# Patient Record
(demographics unavailable — no encounter records)

---

## 2024-11-26 NOTE — ASSESSMENT
[FreeTextEntry1] : Reyna is a 15 y/o female who presents with her father for behavioral and attention concerns.  She does not have school-based accommodations.  She is an honor student however this year struggling in school related to incomplete assignments.  There is a strong family history of ADHD.   Patient was screened negative for problems such as hearing, vision, anxiety, depression, and sleep problems. Will proceed with evaluation for ADHD.  If meets criteria, patient/father would like her to start ADHD medication.

## 2024-11-26 NOTE — PHYSICAL EXAM
[Well-appearing] : well-appearing [No dysmorphic facial features] : no dysmorphic facial features [Alert] : alert [Well related, good eye contact] : well related, good eye contact [Conversant] : conversant [Normal speech and language] : normal speech and language [Follows instructions well] : follows instructions well [Gross hearing intact] : gross hearing intact [Normal gait] : normal gait [de-identified] : Interactive and appropriate mood, friendly, well spoken.

## 2024-11-26 NOTE — HISTORY OF PRESENT ILLNESS
[FreeTextEntry1] : Reyna is a 15 y/o female here for an initial visit for inattention and behavioral concerns.  Patient lives with parents and sister (13 y/o) and attends 10 th grade at [employer].  They live in [place], father is a  and mother an oral pathologist.  Father endorses patient has always been an excellent student until recently and is concerned that her academic performance has declined due to ADHD-related problems.  She procrastinates and has a hard time getting her work completed and handing things in on time.  Grades started to decline last year.  Father feels the material is not overly hard, it is more organizational problems.. She does go to extra help often and is able to focus with the 1:1 closer attention.  The family moved from [place] to [place] in middle school however she did well despite the transition.   Father has a diagnosis of ADD and feels he had the same trajectory.  He takes Vyvanse   Patient endorses last year she was in high honors last year, and now near failing in math and chemistry. Highest grade in math was 75% and chemistry 65%.   She zones out in every class, her mind is always somewhere else, her mind is not hearing things, especially in a boring class.   Teachers have not noticed. For tests she can lock in on.  She sometimes gets nervous before a test but usually feels she is going to do well.  She avoids doing her homework, and this brings her grade down.  She has so many late assignments, and just can't sit down to do it under she feels she is failing or will do it in the period before.   She is very fidgety and asks to use the bathroom a lot of walk around.  She felt a lot of "pent upness." Patient denies having anxiety or depression but sometimes worries about bad things happening.  She has no difficulties with peers.    DEVELOPMENTAL HX  Child was born full term without complications and reached all age-appropriate developmental milestones without concerns. Early Intervention Services were not needed.   EDUCATIONAL HISTORY   Elementary School; [employer] in [place], no issues, great student. Moved to [place] in 6th grade (during Covid)                                                                                        Middle School: [employer]. Did well but more challenging for organization.    HOME BEHAVIORS:   -Attention: Parent report patient is very easily distracted, needs frequent redirection, and has sometimes is forgetful and sometimes difficulty with multi-step instructions.  Hard to get her to do what she doesn't wants to do or is not interested.     -Hyperactivity: Fidgets all the time.    -Impulsivity:  Is not aggressive, does not interrupts when others are speaking.   -Organization: Has difficulty staying organized, often loses things.     -Homework: Forgets to hand in assignments.       CURRENT SCHOOL -School: [employer]  -Special Ed services- No 504 plan or IEP - General education classroom -Academic performance/grades:  Failing chemistry, 75% in math (previously high honor role)   RELATIONSHIPS: Gets along well with peers, parents.   EMOTIONAL  Denies anxiety/depression  SLEEP Sleep is very good, gets 8-9 hours/night.    ACTIVITIES/INTERESTS: Boxing, tennis  MEDICAL HISTORY: Denies a history of tics Denies history of starting spells, twitching, seizure-like activity.   FAMILY HISTORY: Family history of ADHD: Father (takes Vyvanse)  Family history of anxiety or depression: Denies Denies family history of cardiac conditions or early unexplained deaths.

## 2024-11-26 NOTE — PLAN
[FreeTextEntry1] : -Psychoeducation provided regarding possible ADHD diagnosis. -[place] parent and teacher forms to be completed prior to next visit. -Follow up in 1 month

## 2024-11-26 NOTE — PHYSICAL EXAM
[Well-appearing] : well-appearing [No dysmorphic facial features] : no dysmorphic facial features [Alert] : alert [Well related, good eye contact] : well related, good eye contact [Conversant] : conversant [Normal speech and language] : normal speech and language [Follows instructions well] : follows instructions well [Gross hearing intact] : gross hearing intact [Normal gait] : normal gait [de-identified] : Interactive and appropriate mood, friendly, well spoken.

## 2024-11-26 NOTE — PLAN
[FreeTextEntry1] : -Psychoeducation provided regarding possible ADHD diagnosis. -[place] parent and teacher forms to be completed prior to next visit. -Follow up in 1 month

## 2024-11-26 NOTE — HISTORY OF PRESENT ILLNESS
[FreeTextEntry1] : Reyna is a 15 y/o female here for an initial visit for inattention and behavioral concerns.  Patient lives with parents and sister (13 y/o) and attends 10 th grade at [employer].  They live in [place], father is a  and mother an oral pathologist.  Father endorses patient has always been an excellent student until recently and is concerned that her academic performance has declined due to ADHD-related problems.  She procrastinates and has a hard time getting her work completed and handing things in on time.  Grades started to decline last year.  Father feels the material is not overly hard, it is more organizational problems.. She does go to extra help often and is able to focus with the 1:1 closer attention.  The family moved from [place] to [place] in middle school however she did well despite the transition.   Father has a diagnosis of ADD and feels he had the same trajectory.  He takes Vyvanse   Patient endorses last year she was in high honors last year, and now near failing in math and chemistry. Highest grade in math was 75% and chemistry 65%.   She zones out in every class, her mind is always somewhere else, her mind is not hearing things, especially in a boring class.   Teachers have not noticed. For tests she can lock in on.  She sometimes gets nervous before a test but usually feels she is going to do well.  She avoids doing her homework, and this brings her grade down.  She has so many late assignments, and just can't sit down to do it under she feels she is failing or will do it in the period before.   She is very fidgety and asks to use the bathroom a lot of walk around.  She felt a lot of "pent upness." Patient denies having anxiety or depression but sometimes worries about bad things happening.  She has no difficulties with peers.    DEVELOPMENTAL HX  Child was born full term without complications and reached all age-appropriate developmental milestones without concerns. Early Intervention Services were not needed.   EDUCATIONAL HISTORY   Elementary School; [employer] in [place], no issues, great student. Moved to [place] in 6th grade (during Covid)                                                                                        Middle School: [employer]. Did well but more challenging for organization.    HOME BEHAVIORS:   -Attention: Parent report patient is very easily distracted, needs frequent redirection, and has sometimes is forgetful and sometimes difficulty with multi-step instructions.  Hard to get her to do what she doesn't wants to do or is not interested.     -Hyperactivity: Fidgets all the time.    -Impulsivity:  Is not aggressive, does not interrupts when others are speaking.   -Organization: Has difficulty staying organized, often loses things.     -Homework: Forgets to hand in assignments.       CURRENT SCHOOL -School: [employer]  -Special Ed services- No 504 plan or IEP - General education classroom -Academic performance/grades:  Failing chemistry, 75% in math (previously high honor role)   RELATIONSHIPS: Gets along well with peers, parents.   EMOTIONAL  Denies anxiety/depression  SLEEP Sleep is very good, gets 8-9 hours/night.    ACTIVITIES/INTERESTS: Boxing, tennis  MEDICAL HISTORY: Denies a history of tics Denies history of starting spells, twitching, seizure-like activity.   FAMILY HISTORY: Family history of ADHD: Father (takes Vyvanse)  Family history of anxiety or depression: Denies Denies family history of cardiac conditions or early unexplained deaths.

## 2024-12-20 NOTE — ASSESSMENT
[FreeTextEntry1] : Reyna is a 15 y/o female who is here for a follow up ADHD s/p seen with her father 11/26/24 for behavioral and attention concerns.  She does not have school-based accommodations.  She is an honor student however this year struggling in school related to incomplete assignments.  There is a strong family history of ADHD.       Based [place] forms, parent/child  interview, history and clinical assessment child meets criteria for ADHD diagnosis, predominantly inattentive presentation. [place] teacher forms did not meet strict 6/9 criteria for ADHD.  Some highly intelligent children with inattention problems are not diagnosed until later as often do not show functional impairments until the work exceeds their abilities to self manage. A highly supportive environment can also be a reason for minimal observable impairments. Child was screened for other possible causes of inattention including anxiety, depression, LD, vision and hearing problems and no alternate reason was found for the inattention symptoms  Education was provided regarding ADHD diagnosis and treatment.  Medication and behavioral therapy techniques can be effective in treating children with ADHD. Stimulant medication discussed as 1st line medication used to treat symptoms of ADHD.   Medication common side effects discussed including stomach aches, headaches, difficulty with sleep initiation and jitteriness.  Motor tics may occur in some children.  Possible decrease in growth velocity can occur in the first year.  School interventions such as a 504 Plan discussed.

## 2024-12-20 NOTE — HISTORY OF PRESENT ILLNESS
[Home] : at home, [unfilled] , at the time of the visit. [Medical Office: ([facility])___] : at the medical office located in  [FreeTextEntry1] : Reyna is a 15 y/o female who is here for a follow up ADHD s/p seen with her father  11/26/24 for behavioral and attention concerns.  She does not have school-based accommodations.  She is an honor student however this year struggling in school related to incomplete assignments.  There is a strong family history of ADHD.   Patient was screened negative for problems such as hearing, vision, anxiety, depression, and sleep problems. If indicated, patient/father would like her to start ADHD medication.    PLAN FROM PREVIOUS VISIT -Psychoeducation provided regarding possible ADHD diagnosis. -[place] parent and teacher forms to be completed prior to next visit. -Follow up in 1 month  INTERIM HPI  [place] SCALES   PARENT: Inattention: 7/9 + Hyperactivity: 5/9 +- ODD:   0/8 - CD:  0/14 - Anxiety/Depression:  2/7 - IMPAIRMENTS (score of 4 or 5)   Academic overall performance: YES (4= SOMEWHAT OF A PROBLEM)    Reading, No (score of 3= average)    Writing No (score of 3= average)    Math: YES (5= PROBLEMATIC)   Relationships with parents: No (2= above average)                                 siblings: No (2= above average)                                 peers: No (score of 3= average)                                 organized activities/teams: No (score of 3= average)   TEACHER: Inattention:  2/9 (3 occasional) Hyperactivity:   0/9 ODD/CD:  0/10 Anxiety/Depression:  0/7 IMPAIRMENTS Academic & Social Performance in any of the following? 1.Reading No (score of 3= average) 2 Writing No (score of 3= average) 3.Math YES (4= SOMEWHAT OF A PROBLEM) 4. Relationships with peers No (1=excellent) 5. Following directions No (score of 3= average) 6. Disrupting class No (1=excellent) 7. Assignment completion YES (4= SOMEWHAT OF A PROBLEM) 8. Organizational skills No (score of 3= average) COMMENTS: "REYNA OFTEN HANDS IN ASSIGNMENTS LATE AND STRUGGLES ON TEST AND QUIZZES.    HPI FROM VISIT ON 11/26/24  Reyna is a 15 y/o female here for an initial visit for inattention and behavioral concerns.  Patient lives with parents and sister (11 y/o) and attends 10 th grade at [employer].  They live in [place], father is a  and mother an oral pathologist.  Father endorses patient has always been an excellent student until recently and is concerned that her academic performance has declined due to ADHD-related problems.  She procrastinates and has a hard time getting her work completed and handing things in on time.  Grades started to decline last year.  Father feels the material is not overly hard, it is more organizational problems.. She does go to extra help often and is able to focus with the 1:1 closer attention.  The family moved from [place] to [place] in middle school however she did well despite the transition.   Father has a diagnosis of ADD and feels he had the same trajectory.  He takes Vyvanse   Patient endorses last year she was in high honors last year, and now near failing in math and chemistry. Highest grade in math was 75% and chemistry 65%.   She zones out in every class, her mind is always somewhere else, her mind is not hearing things, especially in a boring class.   Teachers have not noticed. For tests she can lock in on.  She sometimes gets nervous before a test but usually feels she is going to do well.  She avoids doing her homework, and this brings her grade down.  She has so many late assignments, and just can't sit down to do it under she feels she is failing or will do it in the period before.   She is very fidgety and asks to use the bathroom a lot of walk around.  She felt a lot of "pent upness." Patient denies having anxiety or depression but sometimes worries about bad things happening.  She has no difficulties with peers.    DEVELOPMENTAL HX  Child was born full term without complications and reached all age-appropriate developmental milestones without concerns. Early Intervention Services were not needed.   EDUCATIONAL HISTORY   Elementary School; [employer] in [place], no issues, great student. Moved to [place] in 6th grade (during Covid)                                                                                        Middle School: [employer]. Did well but more challenging for organization.    HOME BEHAVIORS:   -Attention: Parent report patient is very easily distracted, needs frequent redirection, and has sometimes is forgetful and sometimes difficulty with multi-step instructions.  Hard to get her to do what she doesn't wants to do or is not interested.     -Hyperactivity: Fidgets all the time.    -Impulsivity:  Is not aggressive, does not interrupts when others are speaking.   -Organization: Has difficulty staying organized, often loses things.     -Homework: Forgets to hand in assignments.       CURRENT SCHOOL -School: [employer]  -Public school  -Special Ed services- No 504 plan or IEP - General education classroom -Academic performance/grades:  Failing chemistry, 75% in math (previously high honor role)   RELATIONSHIPS: Gets along well with peers, parents.   EMOTIONAL  Denies anxiety/depression  SLEEP Sleep is very good, gets 8-9 hours/night.    ACTIVITIES/INTERESTS: Boxing, tennis  MEDICAL HISTORY: Denies a history of tics Denies history of starting spells, twitching, seizure-like activity.   FAMILY HISTORY: Family history of ADHD: Father (takes Vyvanse)  Family history of anxiety or depression: Denies Denies family history of cardiac conditions or early unexplained deaths.

## 2025-01-02 NOTE — ASSESSMENT
[FreeTextEntry1] : Reyna is a 15 y/o female who is here for a follow up ADHD s/p seen with her father 11/26/24 for behavioral and attention concerns.  She does not have school-based accommodations.  She is an honor student however this year struggling in school related to incomplete assignments.  There is a strong family history of ADHD.       Based [place] forms, parent/child  interview, history and clinical assessment child meets criteria for ADHD diagnosis, predominantly inattentive presentation. [place] teacher forms did not meet strict 6/9 criteria for ADHD.  Some highly intelligent children with inattention problems are not diagnosed until later as often do not show functional impairments until the work exceeds their abilities to self manage. A highly supportive environment can also be a reason for minimal observable impairments. Child was screened for other possible causes of inattention including anxiety, depression, LD, vision and hearing problems and no alternate reason was found for the inattention symptoms  Education was provided regarding ADHD diagnosis and treatment.  Medication and behavioral therapy techniques can be effective in treating children with ADHD. Stimulant medication discussed as 1st line medication used to treat symptoms of ADHD.   Medication common side effects discussed including stomach aches, headaches, difficulty with sleep initiation and jitteriness.  Motor tics may occur in some children.  Possible decrease in growth velocity can occur in the first year.  School interventions such as a 504 Plan discussed.

## 2025-01-02 NOTE — PLAN
[FreeTextEntry1] : -Start Vyvanse 10 mg once daily in the morning.  May increase to 20 mg (2 X 10 mg) in 1-2 weeks if well tolerated but not effective.  -Psychoeducation provided re: ADHD.  Resources for education given -Discussed management for ADHD including behavioral interventions and medication options. -Counseling on ADHD medication; stimulants are 1st line treatment. Discussed stimulant medications; risks, benefits, possible side effects. -Letter to request 504 Plan for school provided -Follow up in 1 month

## 2025-01-02 NOTE — PHYSICAL EXAM
[Well-appearing] : well-appearing [No dysmorphic facial features] : no dysmorphic facial features [Alert] : alert [Well related, good eye contact] : well related, good eye contact [Conversant] : conversant [Normal speech and language] : normal speech and language [Gross hearing intact] : gross hearing intact [de-identified] : Well spoken, interactive and appropriate mood, friendly.

## 2025-01-02 NOTE — CONSULT LETTER
[Courtesy Letter:] : I had the pleasure of seeing your patient, [unfilled], in my office today. [Please see my note below.] : Please see my note below. [Sincerely,] : Sincerely, [FreeTextEntry3] : Prisca Brennan, PNP-PC, [facility] Division of Pediatric Neurology [address], [id number] [phone]

## 2025-01-02 NOTE — HISTORY OF PRESENT ILLNESS
[Father] : father [FreeTextEntry3] : father [FreeTextEntry1] : Reyna is a 15 y/o female who is here for a follow up ADHD s/p seen with her father  11/26/24 for behavioral and attention concerns.  She does not have school-based accommodations.  She is an honor student however this year struggling in school related to incomplete assignments.  There is a strong family history of ADHD.   Patient was screened negative for problems such as hearing, vision, anxiety, depression, and sleep problems. If indicated, patient/father would like her to start ADHD medication.    PLAN FROM PREVIOUS VISIT -Psychoeducation provided regarding possible ADHD diagnosis. -[place] parent and teacher forms to be completed prior to next visit. -Follow up in 1 month  INTERIM HPI  JOHNNY SCALES   PARENT: Inattention: 7/9 + Hyperactivity: 5/9 +- ODD:   0/8 - CD:  0/14 - Anxiety/Depression:  2/7 - IMPAIRMENTS (score of 4 or 5)   Academic overall performance: YES (4= SOMEWHAT OF A PROBLEM)    Reading, No (score of 3= average)    Writing No (score of 3= average)    Math: YES (5= PROBLEMATIC)   Relationships with parents: No (2= above average)                                 siblings: No (2= above average)                                 peers: No (score of 3= average)                                 organized activities/teams: No (score of 3= average)   TEACHER: Inattention:  2/9 (3 occasional) Hyperactivity:   0/9 ODD/CD:  0/10 Anxiety/Depression:  0/7 IMPAIRMENTS Academic & Social Performance in any of the following? 1.Reading No (score of 3= average) 2 Writing No (score of 3= average) 3.Math YES (4= SOMEWHAT OF A PROBLEM) 4. Relationships with peers No (1=excellent) 5. Following directions No (score of 3= average) 6. Disrupting class No (1=excellent) 7. Assignment completion YES (4= SOMEWHAT OF A PROBLEM) 8. Organizational skills No (score of 3= average) COMMENTS: "REYNA OFTEN HANDS IN ASSIGNMENTS LATE AND STRUGGLES ON TEST AND QUIZZES.  TEACHER: Stormy Mccloud Inattentive:  3/9 (4 occasional)  Hyperactive:  0/9 ODD/CD  0/10 Anxiety/depression:   1/7 IMPAIRMENTS IN ANY OF THE FOLLOWING (academic & Social Performance) 1.Reading 2 Writing 3.Math YES (4= SOMEWHAT OF A PROBLEM) 4. Relationships with peers 5. Following directions 6. Disrupting class 7. Assignment completion YES (4= SOMEWHAT OF A PROBLEM) 8. Organizational skills. YES (4= SOMEWHAT OF A PROBLEM)  Father and patient both agreeable to starting ADHD medication.  Father takes Vyvanse 50 mg.      HPI FROM VISIT ON 11/26/24  Reyna is a 15 y/o female here for an initial visit for inattention and behavioral concerns.  Patient lives with parents and sister (13 y/o) and attends 10 th grade at [employer].  They live in [place], father is a  and mother an oral pathologist.  Father endorses patient has always been an excellent student until recently and is concerned that her academic performance has declined due to ADHD-related problems.  She procrastinates and has a hard time getting her work completed and handing things in on time.  Grades started to decline last year.  Father feels the material is not overly hard, it is more organizational problems.. She does go to extra help often and is able to focus with the 1:1 closer attention.  The family moved from [place] to [place] in middle school however she did well despite the transition.   Father has a diagnosis of ADD and feels he had the same trajectory.  He takes Vyvanse   Patient endorses last year she was in high honors last year, and now near failing in math and chemistry. Highest grade in math was 75% and chemistry 65%.   She zones out in every class, her mind is always somewhere else, her mind is not hearing things, especially in a boring class.   Teachers have not noticed. For tests she can lock in on.  She sometimes gets nervous before a test but usually feels she is going to do well.  She avoids doing her homework, and this brings her grade down.  She has so many late assignments, and just can't sit down to do it under she feels she is failing or will do it in the period before.   She is very fidgety and asks to use the bathroom a lot of walk around.  She felt a lot of "pent upness." Patient denies having anxiety or depression but sometimes worries about bad things happening.  She has no difficulties with peers.    DEVELOPMENTAL HX  Child was born full term without complications and reached all age-appropriate developmental milestones without concerns. Early Intervention Services were not needed.   EDUCATIONAL HISTORY   Elementary School; [employer] in [place], no issues, great student. Moved to [place] in 6th grade (during Covid)                                                                                        Middle School: [employer]. Did well but more challenging for organization.    HOME BEHAVIORS:   -Attention: Parent report patient is very easily distracted, needs frequent redirection, and has sometimes is forgetful and sometimes difficulty with multi-step instructions.  Hard to get her to do what she doesn't wants to do or is not interested.     -Hyperactivity: Fidgets all the time.    -Impulsivity:  Is not aggressive, does not interrupts when others are speaking.   -Organization: Has difficulty staying organized, often loses things.     -Homework: Forgets to hand in assignments.       CURRENT SCHOOL -School: [employer]  -[employer]  -Special Ed services- No 504 plan or IEP - General education classroom -Academic performance/grades:  Failing chemistry, 75% in math (previously high honor role)   RELATIONSHIPS: Gets along well with peers, parents.   EMOTIONAL  Denies anxiety/depression  SLEEP Sleep is very good, gets 8-9 hours/night.    ACTIVITIES/INTERESTS: Boxing, tennis  MEDICAL HISTORY: Denies a history of tics Denies history of starting spells, twitching, seizure-like activity.   FAMILY HISTORY: Family history of ADHD: Father (takes Vyvanse)  Family history of anxiety or depression: Denies Denies family history of cardiac conditions or early unexplained deaths.

## 2025-01-02 NOTE — DATA REVIEWED
[FreeTextEntry1] : See HPI -  2 Teacher Vanderbilts and 1 parent Monserrat (+ ADHD, inattentive presentation).

## 2025-01-02 NOTE — PHYSICAL EXAM
[Well-appearing] : well-appearing [No dysmorphic facial features] : no dysmorphic facial features [Alert] : alert [Well related, good eye contact] : well related, good eye contact [Conversant] : conversant [Normal speech and language] : normal speech and language [Gross hearing intact] : gross hearing intact [de-identified] : Well spoken, interactive and appropriate mood, friendly.

## 2025-01-02 NOTE — ASSESSMENT
[FreeTextEntry1] : Reyna is a 15 y/o female who is here for a follow up ADHD s/p seen with her father 11/26/24 for behavioral and attention concerns.  She does not have school-based accommodations.  She is an honor student however this year struggling in school related to incomplete assignments.  There is a strong family history of ADHD.       Based [place] forms, parent/child  interview, history and clinical assessment child meets criteria for ADHD diagnosis, predominantly inattentive presentation. [place] teacher forms did not meet strict 6/9 criteria for ADHD.  Some highly intelligent children with inattention problems are not diagnosed until later as often do not show functional impairments until the work exceeds their abilities to self manage. A highly supportive environment can also be a reason for minimal observable impairments. Child was screened for other possible causes of inattention including anxiety, depression, LD, vision and hearing problems and no alternate reason was found for the inattention symptoms  Education was provided regarding ADHD diagnosis and treatment.  Medication and behavioral therapy techniques can be effective in treating children with ADHD. Stimulant medication discussed as 1st line medication used to treat symptoms of ADHD.   Medication common side effects discussed including stomach aches, headaches, difficulty with sleep initiation and jitteriness.  Motor tics may occur in some children.  Possible decrease in growth velocity can occur in the first year.  School interventions such as a 504 Plan discussed.

## 2025-01-02 NOTE — CONSULT LETTER
[Courtesy Letter:] : I had the pleasure of seeing your patient, [unfilled], in my office today. [Please see my note below.] : Please see my note below. [Sincerely,] : Sincerely, [FreeTextEntry3] : Prisca Brennan, PNP-PC, [facility] Division of Pediatric Neurology [address], [id number] [phone]

## 2025-06-20 NOTE — PLAN
[FreeTextEntry1] : PREVIOUS PLAN -Start Vyvanse 10 mg once daily in the morning.  May increase to 20 mg (2 X 10 mg) in 1-2 weeks if well tolerated but not effective.  -Psychoeducation provided re: ADHD.  Resources for education given -Discussed management for ADHD including behavioral interventions and medication options. -Counseling on ADHD medication; stimulants are 1st line treatment. Discussed stimulant medications; risks, benefits, possible side effects. -Letter to request 504 Plan for school provided -Follow up in 1 month

## 2025-06-20 NOTE — HISTORY OF PRESENT ILLNESS
[Mother] : mother [Home] : at home, [unfilled] , at the time of the visit. [Medical Office: ([facility])___] : at the medical office located in  [Telehealth (audio & video)] : This visit was provided via telehealth using real-time 2-way audio visual technology. [FreeTextEntry3] : mother [FreeTextEntry1] : Reyna is a 17 y/o female who is here for a follow up ADHD s/p seen with her father 01/05/25 when she was diagnosed with ADHD, inattentive presentation, and started on Vyvanse.   She did not have school-based accommodations.  She is an honor student however this year struggling in school related to incomplete assignments.  There is a strong family history of ADHD.    PLAN FROM PREVIOUS VISIT -Start Vyvanse 10 mg once daily in the morning.  May increase to 20 mg (2 X 10 mg) in 1-2 weeks if well tolerated but not effective.  -Psychoeducation provided re: ADHD.  Resources for education given -Discussed management for ADHD including behavioral interventions and medication options. -Counseling on ADHD medication; stimulants are 1st line treatment. Discussed stimulant medications; risks, benefits, possible side effects. -Letter to request 504 Plan for school provided -Follow up in 1 month  INTERIM HPI Has been taking Vyvanse 20 mg.  Mother had called 01/13/25 to report effectiveness. 504 Plan?  _________________________________ HPI FROM VISIT ON 01/05/25  Reyna is a 15 y/o female who is here for a follow up ADHD s/p seen with her father  11/26/24 for behavioral and attention concerns.  She does not have school-based accommodations.  She is an honor student however this year struggling in school related to incomplete assignments.  There is a strong family history of ADHD.   Patient was screened negative for problems such as hearing, vision, anxiety, depression, and sleep problems. If indicated, patient/father would like her to start ADHD medication.    PLAN FROM PREVIOUS VISIT -Psychoeducation provided regarding possible ADHD diagnosis. -[place] parent and teacher forms to be completed prior to next visit. -Follow up in 1 month  INTERIM HPI  JOHNNY SCALES   PARENT: Inattention: 7/9 + Hyperactivity: 5/9 +- ODD:   0/8 - CD:  0/14 - Anxiety/Depression:  2/7 - IMPAIRMENTS (score of 4 or 5)   Academic overall performance: YES (4= SOMEWHAT OF A PROBLEM)    Reading, No (score of 3= average)    Writing No (score of 3= average)    Math: YES (5= PROBLEMATIC)   Relationships with parents: No (2= above average)                                 siblings: No (2= above average)                                 peers: No (score of 3= average)                                 organized activities/teams: No (score of 3= average)   TEACHER: Inattention:  2/9 (3 occasional) Hyperactivity:   0/9 ODD/CD:  0/10 Anxiety/Depression:  0/7 IMPAIRMENTS Academic & Social Performance in any of the following? 1.Reading No (score of 3= average) 2 Writing No (score of 3= average) 3.Math YES (4= SOMEWHAT OF A PROBLEM) 4. Relationships with peers No (1=excellent) 5. Following directions No (score of 3= average) 6. Disrupting class No (1=excellent) 7. Assignment completion YES (4= SOMEWHAT OF A PROBLEM) 8. Organizational skills No (score of 3= average) COMMENTS: "REYNA OFTEN HANDS IN ASSIGNMENTS LATE AND STRUGGLES ON TEST AND QUIZZES.  TEACHER: Stormy Mccloud Inattentive:  3/9 (4 occasional)  Hyperactive:  0/9 ODD/CD  0/10 Anxiety/depression:   1/7 IMPAIRMENTS IN ANY OF THE FOLLOWING (academic & Social Performance) 1.Reading 2 Writing 3.Math YES (4= SOMEWHAT OF A PROBLEM) 4. Relationships with peers 5. Following directions 6. Disrupting class 7. Assignment completion YES (4= SOMEWHAT OF A PROBLEM) 8. Organizational skills. YES (4= SOMEWHAT OF A PROBLEM)  Father and patient both agreeable to starting ADHD medication.  Father takes Vyvanse 50 mg.      HPI FROM VISIT ON 11/26/24  Reyna is a 15 y/o female here for an initial visit for inattention and behavioral concerns.  Patient lives with parents and sister (13 y/o) and attends 10 th grade at [employer].  They live in [place], father is a  and mother an oral pathologist.  Father endorses patient has always been an excellent student until recently and is concerned that her academic performance has declined due to ADHD-related problems.  She procrastinates and has a hard time getting her work completed and handing things in on time.  Grades started to decline last year.  Father feels the material is not overly hard, it is more organizational problems.. She does go to extra help often and is able to focus with the 1:1 closer attention.  The family moved from [place] to [place] in middle school however she did well despite the transition.   Father has a diagnosis of ADD and feels he had the same trajectory.  He takes Vyvanse   Patient endorses last year she was in high honors last year, and now near failing in math and chemistry. Highest grade in math was 75% and chemistry 65%.   She zones out in every class, her mind is always somewhere else, her mind is not hearing things, especially in a boring class.   Teachers have not noticed. For tests she can lock in on.  She sometimes gets nervous before a test but usually feels she is going to do well.  She avoids doing her homework, and this brings her grade down.  She has so many late assignments, and just can't sit down to do it under she feels she is failing or will do it in the period before.   She is very fidgety and asks to use the bathroom a lot of walk around.  She felt a lot of "pent upness." Patient denies having anxiety or depression but sometimes worries about bad things happening.  She has no difficulties with peers.    DEVELOPMENTAL HX  Child was born full term without complications and reached all age-appropriate developmental milestones without concerns. Early Intervention Services were not needed.   EDUCATIONAL HISTORY   Elementary School; [employer] in [place], no issues, great student. Moved to [place] in 6th grade (during Covid)                                                                                        Middle School: [employer]. Did well but more challenging for organization.    HOME BEHAVIORS:   -Attention: Parent report patient is very easily distracted, needs frequent redirection, and has sometimes is forgetful and sometimes difficulty with multi-step instructions.  Hard to get her to do what she doesn't wants to do or is not interested.     -Hyperactivity: Fidgets all the time.    -Impulsivity:  Is not aggressive, does not interrupts when others are speaking.   -Organization: Has difficulty staying organized, often loses things.     -Homework: Forgets to hand in assignments.       CURRENT SCHOOL -School: [employer]  -[employer] school  -Special Ed services- No 504 plan or IEP - General education classroom -Academic performance/grades:  Failing chemistry, 75% in math (previously high honor role)   RELATIONSHIPS: Gets along well with peers, parents.   EMOTIONAL  Denies anxiety/depression  SLEEP Sleep is very good, gets 8-9 hours/night.    ACTIVITIES/INTERESTS: Boxing, tennis  MEDICAL HISTORY: Denies a history of tics Denies history of starting spells, twitching, seizure-like activity.   FAMILY HISTORY: Family history of ADHD: Father (takes Vyvanse)  Family history of anxiety or depression: Denies Denies family history of cardiac conditions or early unexplained deaths.

## 2025-06-20 NOTE — ASSESSMENT
[FreeTextEntry1] : Reyna is a 15 y/o female who is here for a follow up ADHD s/p seen with her father 01/05/25 when she was diagnosed with ADHD, inattentive presentation, and started on Vyvanse.   She did not have school-based accommodations.--------504 Plan??------  She is an honor student however this year struggling in school related to incomplete assignments.  There is a strong family history of ADHD.   Vyvanse 20 mg

## 2025-06-30 NOTE — PHYSICAL EXAM
[Well-appearing] : well-appearing [No dysmorphic facial features] : no dysmorphic facial features [Alert] : alert [Well related, good eye contact] : well related, good eye contact [Conversant] : conversant [Normal speech and language] : normal speech and language [Gross hearing intact] : gross hearing intact [de-identified] : Interactive, well spoken, and appropriate mood, friendly.

## 2025-06-30 NOTE — ASSESSMENT
[FreeTextEntry1] : Reyna is a 15 y/o female who is here for a follow up ADHD s/p seen with her father 01/05/25 when she was diagnosed with ADHD, inattentive presentation, and started on Vyvanse.   She does not have school-based accommodations as per patient preference.  She is an honor student however was struggling in school related to incomplete assignments.  There is a strong family history of ADHD.   Vyvanse 20 mg was effective however causing irritable mood and sleep difficulties.  Will switch to Adderall XR 10 mg. Will also start as need immediate release Adderall 5 mg for homework completion or on weekends when needs shorter duration (i.e during SAT classes).

## 2025-06-30 NOTE — PLAN
[FreeTextEntry1] :  -Stop Vyvanse 20 mg (side effects- moodiness, sleep difficulties, appetite suppressed. -Discussed medication options (ile changing to Adderall or a  methylphenidate Discussed stimulant medications, risks, benefits, possible side effects. -Follow up in 2 months- Father to call earlier if concerns.

## 2025-06-30 NOTE — DATA REVIEWED
[FreeTextEntry1] : 01/13/2024  2 Teacher Vanderrodriguezts and 1 parent Monserrat (+ ADHD, inattentive presentation).

## 2025-06-30 NOTE — HISTORY OF PRESENT ILLNESS
[Father] : father [FreeTextEntry3] : father [FreeTextEntry1] : Reyna is a 17 y/o female who is here for a follow up ADHD s/p seen with her father 01/05/25 when she was diagnosed with ADHD, inattentive presentation, and started on Vyvanse.   She did not have school-based accommodations.  She is an honor student however this year struggling in school related to incomplete assignments.  There is a strong family history of ADHD.    PLAN FROM PREVIOUS VISIT -Start Vyvanse 10 mg once daily in the morning.  May increase to 20 mg (2 X 10 mg) in 1-2 weeks if well tolerated but not effective.  -Psychoeducation provided re: ADHD.  Resources for education given -Discussed management for ADHD including behavioral interventions and medication options. -Counseling on ADHD medication; stimulants are 1st line treatment. Discussed stimulant medications; risks, benefits, possible side effects. -Letter to request 504 Plan for school provided -Follow up in 1 month  INTERIM HPI Has been taking Vyvanse 20 mg.  Mother had called 01/13/25 to report effectiveness. No 504 Plan- Patient does not feel she needs it, especially when taking ADHD medication.  Father feels Vyvanse 20 mg works however feels Reyna is reporting some side effects, and sometimes feels efficacy is not as good at times. Patient feels she finds it's harder to fall asleep.  She also has had a decrease in appetite.  It also makes her feels her mood is angrier or "in a bad mood" Her feelings are heightened all day long.   Math and chemistry grades went up a lot.   Father was taking Vyvanse and recently switched to Adderall XR generic which he felt was better and did not affect his sleep.   _________________________________ HPI FROM VISIT ON 01/05/25  Reyna is a 15 y/o female who is here for a follow up ADHD s/p seen with her father  11/26/24 for behavioral and attention concerns.  She does not have school-based accommodations.  She is an honor student however this year struggling in school related to incomplete assignments.  There is a strong family history of ADHD.   Patient was screened negative for problems such as hearing, vision, anxiety, depression, and sleep problems. If indicated, patient/father would like her to start ADHD medication.    PLAN FROM PREVIOUS VISIT -Psychoeducation provided regarding possible ADHD diagnosis. -[place] parent and teacher forms to be completed prior to next visit. -Follow up in 1 month  INTERIM HPI  [place] SCALES   PARENT: Inattention: 7/9 + Hyperactivity: 5/9 +- ODD:   0/8 - CD:  0/14 - Anxiety/Depression:  2/7 - IMPAIRMENTS (score of 4 or 5)   Academic overall performance: YES (4= SOMEWHAT OF A PROBLEM)    Reading, No (score of 3= average)    Writing No (score of 3= average)    Math: YES (5= PROBLEMATIC)   Relationships with parents: No (2= above average)                                 siblings: No (2= above average)                                 peers: No (score of 3= average)                                 organized activities/teams: No (score of 3= average)   TEACHER: Inattention:  2/9 (3 occasional) Hyperactivity:   0/9 ODD/CD:  0/10 Anxiety/Depression:  0/7 IMPAIRMENTS Academic & Social Performance in any of the following? 1.Reading No (score of 3= average) 2 Writing No (score of 3= average) 3.Math YES (4= SOMEWHAT OF A PROBLEM) 4. Relationships with peers No (1=excellent) 5. Following directions No (score of 3= average) 6. Disrupting class No (1=excellent) 7. Assignment completion YES (4= SOMEWHAT OF A PROBLEM) 8. Organizational skills No (score of 3= average) COMMENTS: "REYNA OFTEN HANDS IN ASSIGNMENTS LATE AND STRUGGLES ON TEST AND QUIZZES.  TEACHER: Stormy Mccloud Inattentive:  3/9 (4 occasional)  Hyperactive:  0/9 ODD/CD  0/10 Anxiety/depression:   1/7 IMPAIRMENTS IN ANY OF THE FOLLOWING (academic & Social Performance) 1.Reading 2 Writing 3.Math YES (4= SOMEWHAT OF A PROBLEM) 4. Relationships with peers 5. Following directions 6. Disrupting class 7. Assignment completion YES (4= SOMEWHAT OF A PROBLEM) 8. Organizational skills. YES (4= SOMEWHAT OF A PROBLEM)  Father and patient both agreeable to starting ADHD medication.  Father takes Vyvanse 50 mg.      HPI FROM VISIT ON 11/26/24  Reyna is a 15 y/o female here for an initial visit for inattention and behavioral concerns.  Patient lives with parents and sister (13 y/o) and attends 10 th grade at [employer].  They live in [place], father is a  and mother an oral pathologist.  Father endorses patient has always been an excellent student until recently and is concerned that her academic performance has declined due to ADHD-related problems.  She procrastinates and has a hard time getting her work completed and handing things in on time.  Grades started to decline last year.  Father feels the material is not overly hard, it is more organizational problems.. She does go to extra help often and is able to focus with the 1:1 closer attention.  The family moved from [place] to [place] in middle school however she did well despite the transition.   Father has a diagnosis of ADD and feels he had the same trajectory.  He takes Vyvanse   Patient endorses last year she was in high honors last year, and now near failing in math and chemistry. Highest grade in math was 75% and chemistry 65%.   She zones out in every class, her mind is always somewhere else, her mind is not hearing things, especially in a boring class.   Teachers have not noticed. For tests she can lock in on.  She sometimes gets nervous before a test but usually feels she is going to do well.  She avoids doing her homework, and this brings her grade down.  She has so many late assignments, and just can't sit down to do it under she feels she is failing or will do it in the period before.   She is very fidgety and asks to use the bathroom a lot of walk around.  She felt a lot of "pent upness." Patient denies having anxiety or depression but sometimes worries about bad things happening.  She has no difficulties with peers.    DEVELOPMENTAL HX  Child was born full term without complications and reached all age-appropriate developmental milestones without concerns. Early Intervention Services were not needed.   EDUCATIONAL HISTORY   Elementary School; Public in [place], no issues, great student. Moved to [place] in 6th grade (during Covid)                                                                                        Middle School: [employer]. Did well but more challenging for organization.    HOME BEHAVIORS:   -Attention: Parent report patient is very easily distracted, needs frequent redirection, and has sometimes is forgetful and sometimes difficulty with multi-step instructions.  Hard to get her to do what she doesn't wants to do or is not interested.     -Hyperactivity: Fidgets all the time.    -Impulsivity:  Is not aggressive, does not interrupts when others are speaking.   -Organization: Has difficulty staying organized, often loses things.     -Homework: Forgets to hand in assignments.       CURRENT SCHOOL -School: [employer]  -Public school  -Special Ed services- No 504 plan or IEP - General education classroom -Academic performance/grades:  Failing chemistry, 75% in math (previously high honor role)   RELATIONSHIPS: Gets along well with peers, parents.   EMOTIONAL  Denies anxiety/depression  SLEEP Sleep is very good, gets 8-9 hours/night.    ACTIVITIES/INTERESTS: Boxing, tennis  MEDICAL HISTORY: Denies a history of tics Denies history of starting spells, twitching, seizure-like activity.   FAMILY HISTORY: Family history of ADHD: Father (takes Vyvanse)  Family history of anxiety or depression: Denies Denies family history of cardiac conditions or early unexplained deaths.

## 2025-07-28 NOTE — HISTORY OF PRESENT ILLNESS
[Yes] : Patient goes to dentist yearly [Toothpaste] : Primary Fluoride Source: Toothpaste [Up to date] : Up to date [Normal] : normal [Eats meals with family] : eats meals with family [Has family members/adults to turn to for help] : has family members/adults to turn to for help [Is permitted and is able to make independent decisions] : Is permitted and is able to make independent decisions [Sleep Concerns] : no sleep concerns [Grade: ____] : Grade: [unfilled] [Normal Performance] : normal performance [Normal Behavior/Attention] : normal behavior/attention [Normal Homework] : normal homework [Eats regular meals including adequate fruits and vegetables] : eats regular meals including adequate fruits and vegetables [Drinks non-sweetened liquids] : drinks non-sweetened liquids  [Calcium source] : calcium source [Has concerns about body or appearance] : does not have concerns about body or appearance [Has friends] : has friends [At least 1 hour of physical activity a day] : at least 1 hour of physical activity a day [Screen time (except homework) less than 2 hours a day] : no screen time (except homework) less than 2 hours a day [Has interests/participates in community activities/volunteers] : has interests/participates in community activities/volunteers. [Uses electronic nicotine delivery system] : does not use electronic nicotine delivery system [Exposure to electronic nicotine delivery system] : no exposure to electronic nicotine delivery system [Uses tobacco] : does not use tobacco [Exposure to tobacco] : no exposure to tobacco [Uses drugs] : does not use drugs  [Exposure to drugs] : no exposure to drugs [Drinks alcohol] : does not drink alcohol [Exposure to alcohol] : no exposure to alcohol [Uses safety belts/safety equipment] : uses safety belts/safety equipment  [Impaired/distracted driving] : no impaired/distracted driving [Has peer relationships free of violence] : has peer relationships free of violence [No] : Patient has not had sexual intercourse. [Has ways to cope with stress] : has ways to cope with stress [Displays self-confidence] : displays self-confidence [Has problems with sleep] : does not have problems with sleep [Gets depressed, anxious, or irritable/has mood swings] : does not get depressed, anxious, or irritable/has mood swings [Has thought about hurting self or considered suicide] : has not thought about hurting self or considered suicide [With Teen] : teen [de-identified] : Psoriasis [FreeTextEntry1] : 16-year-old female presenting for annual physical examination and health maintenance. The primary concern is scalp psoriasis and associated symptoms. Patient reports itchy, flaky patches on her scalp and the back of her neck, which she suspects to be psoriasis based on a consultation with her friend's mother, a dermatologist. She had previously believed it to be eczema and had been using steroid creams such as clobetasol and hydrocortisone. She describes thickened skin, scaling, and bleeding in affected areas from picking off flakes. She does not use moisturizers or specific psoriasis shampoos regularly but does use dandruff shampoos such as Selsun Blue. She also reports having had eczema as a child, primarily affecting her legs and arms. She currently showers every other day, keeping water temperature warm, and occasionally uses scalp-focused conditioners. Patient requests a referral to dermatology. Additionally, she states having lost weight recently due to decreased appetite from ADHD medication, BMI currently 23. She has remained physically active and reports no other significant complaints apart from occasional dry skin patches. Entering robert year of high school. Plays tennis and is preparing for the upcoming full season. Not sexually active. No alcohol or substance use. No tobacco.

## 2025-07-28 NOTE — PHYSICAL EXAM
[Alert] : alert [No Acute Distress] : no acute distress [Normocephalic] : normocephalic [EOMI Bilateral] : EOMI bilateral [PERRLA] : MAGNO [Conjunctivae with no discharge] : conjunctivae with no discharge [Clear tympanic membranes with bony landmarks and light reflex present bilaterally] : clear tympanic membranes with bony landmarks and light reflex present bilaterally  [Auditory Canals Clear] : auditory canals clear [Pink Nasal Mucosa] : pink nasal mucosa [Nares Patent] : nares patent [No Discharge] : no discharge [Nonerythematous Oropharynx] : nonerythematous oropharynx [Supple, full passive range of motion] : supple, full passive range of motion [No Palpable Masses] : no palpable masses [Clear to Auscultation Bilaterally] : clear to auscultation bilaterally [Regular Rate and Rhythm] : regular rate and rhythm [Normal S1, S2 audible] : normal S1, S2 audible [No Murmurs] : no murmurs [Soft] : soft [NonTender] : non tender [Non Distended] : non distended [No Abnormal Lymph Nodes Palpated] : no abnormal lymph nodes palpated [Normal Muscle Tone] : normal muscle tone [No Gait Asymmetry] : no gait asymmetry [No pain or deformities with palpation of bone, muscles, joints] : no pain or deformities with palpation of bone, muscles, joints [de-identified] : dryness and scaling present in continuous distribution with distinct irregular borders on posterior neck as well as at hairline of scalp